# Patient Record
Sex: MALE | Race: BLACK OR AFRICAN AMERICAN | Employment: FULL TIME | URBAN - METROPOLITAN AREA
[De-identification: names, ages, dates, MRNs, and addresses within clinical notes are randomized per-mention and may not be internally consistent; named-entity substitution may affect disease eponyms.]

---

## 2018-09-02 ENCOUNTER — TRANSFERRED RECORDS (OUTPATIENT)
Dept: HEALTH INFORMATION MANAGEMENT | Facility: CLINIC | Age: 28
End: 2018-09-02

## 2020-04-23 NOTE — TELEPHONE ENCOUNTER
RECORDS RECEIVED FROM: internal, CE   DATE RECEIVED: 5.26.20   NOTES STATUS DETAILS   OFFICE NOTE from referring provider    Internal  4.24.20 Prem florentino   OFFICE NOTE from other cardiologist    Internal  maynor rolle   DISCHARGE SUMMARY from hospital    internal 9.2.18 rox blanc      DISCHARGE REPORT from the ER   n/a    OPERATIVE REPORT    n/a    MEDICATION LIST   n/a    LABS     BMP   Internal  9.3.18   CBC   Internal  9.2.18   CMP   n/a    Lipids   n/a    TSH   n/a    DIAGNOSTIC PROCEDURES     EKG   CE [facility]- 9.4.18    Monitor Reports   Internal  4.24.20    IMAGING (DISC & REPORT)      Echo   CE [facility] 9.3.18   Stress Tests   n/a    Cath   n/a    MRI/MRA   n/a    CT/CTA   n/a       Action 5.27.20 sv   Action Taken Called HP and LVM about requesting records   9am received ECG- 9.4.18, echo 9.3.18  sent to scanning and notified clinic         Action 5.26.20   Action Taken Called pt to gather records, no call back number, couldn't leave voicemail   Request sent to [facility] for   ECG- 9.4.18  Echo- 9.3.18         Action    Action Taken 4-23: Called pt to see if he's been seen lately. Since working from home, don't have a call back number, so didn't leave voicemail for pt.

## 2020-04-24 DIAGNOSIS — R00.2 PALPITATIONS: Primary | ICD-10-CM

## 2020-04-24 DIAGNOSIS — G47.00 INSOMNIA, UNSPECIFIED TYPE: ICD-10-CM

## 2020-04-28 ENCOUNTER — TELEPHONE (OUTPATIENT)
Dept: CARDIOLOGY | Facility: CLINIC | Age: 30
End: 2020-04-28

## 2020-04-28 NOTE — TELEPHONE ENCOUNTER
"Message was left for the patient to call back with his correct insurance information.  I did explain that currently his zio patch is listed as a \"self pay\" and that if he wants zio patch to send a claim to the insurance company, we will need that information.     A phone number was left for the patient to call the EP line back and supply this information as well as who his PCP would be.     Latasha Grace  [facility] Electrophysiology   [phone]    "

## 2020-05-03 PROCEDURE — 0298T ZZC EXT ECG > 48HR TO 21 DAY REVIEW AND INTERPRETATN: CPT | Mod: ZP | Performed by: INTERNAL MEDICINE

## 2020-05-27 ENCOUNTER — PRE VISIT (OUTPATIENT)
Dept: CARDIOLOGY | Facility: CLINIC | Age: 30
End: 2020-05-27

## 2020-06-02 ENCOUNTER — TELEPHONE (OUTPATIENT)
Dept: CARDIOLOGY | Facility: CLINIC | Age: 30
End: 2020-06-02

## 2020-06-02 NOTE — TELEPHONE ENCOUNTER
EP Scheduling attempted to reach the patient to find out where he mailed his zio patch monitor so that a trace can be put on the device.     A message was left on  and an email sent alerting the patient that the zio patch data has not been obtained and the appointment for [facility] [address] has been cancelled until the device can be located and processed so that Dr. Arvizu has the data to discuss.     EP scheduling number was left for the patient to call back with the information and to reschedule.    Latasha Grace  [facility] Electrophysiology   [phone]

## 2020-06-12 DIAGNOSIS — R00.2 PALPITATIONS: ICD-10-CM
